# Patient Record
(demographics unavailable — no encounter records)

---

## 2019-08-12 NOTE — NUR
PT STATES HE IS COMING AT 0800. WARNED HIM HE WILL HAVE TO WAIT FOR AN
ADDITIONAL HOUR. PT NOT CONCERNED

## 2019-08-16 NOTE — NUR
liver lesion speciman obtained and put in formulin. pressure held on area as
directed and bandaid applied